# Patient Record
(demographics unavailable — no encounter records)

---

## 2025-03-07 NOTE — PHYSICAL EXAM
[General Appearance - Alert] : alert [General Appearance - In No Acute Distress] : in no acute distress [General Appearance - Well Nourished] : well nourished [General Appearance - Well-Appearing] : healthy appearing [Sclera] : the sclera and conjunctiva were normal [PERRL With Normal Accommodation] : pupils were equal in size, round, reactive to light [Extraocular Movements] : extraocular movements were intact [Outer Ear] : the ears and nose were normal in appearance [Hearing Threshold Finger Rub Not Hinds] : hearing was normal [Examination Of The Oral Cavity] : the lips and gums were normal [Both Tympanic Membranes Were Examined] : both tympanic membranes were normal [Oropharynx] : the oropharynx was normal with no thrush [Neck Appearance] : the appearance of the neck was normal [Jugular Venous Distention Increased] : there was no jugular-venous distention [Thyroid Diffuse Enlargement] : the thyroid was not enlarged [Respiration, Rhythm And Depth] : normal respiratory rhythm and effort [Exaggerated Use Of Accessory Muscles For Inspiration] : no accessory muscle use [Auscultation Breath Sounds / Voice Sounds] : lungs were clear to auscultation bilaterally [Heart Rate And Rhythm] : heart rate was normal and rhythm regular [Heart Sounds] : normal S1 and S2 [Heart Sounds Gallop] : no gallops [Murmurs] : no murmurs [Heart Sounds Pericardial Friction Rub] : no pericardial rub [Edema] : there was no peripheral edema [Bowel Sounds] : normal bowel sounds [Abdomen Soft] : soft [Abdomen Tenderness] : non-tender [Costovertebral Angle Tenderness] : no CVA tenderness [No Palpable Adenopathy] : no palpable adenopathy [Cervical Lymph Nodes Enlarged Posterior Bilaterally] : posterior cervical [Cervical Lymph Nodes Enlarged Anterior Bilaterally] : anterior cervical [Musculoskeletal - Swelling] : no joint swelling [Range of Motion to Joints] : range of motion to joints [Nail Clubbing] : no clubbing  or cyanosis of the fingernails [Motor Tone] : muscle strength and tone were normal [Skin Color & Pigmentation] : normal skin color and pigmentation [] : no rash [Skin Lesions] : no skin lesions [FreeTextEntry1] : Thick yellow toe nails - first two digits of right foot.  [Cranial Nerves] : cranial nerves 2-12 were intact [Sensation] : the sensory exam was normal to light touch and pinprick [Motor Exam] : the motor exam was normal [No Focal Deficits] : no focal deficits [Oriented To Time, Place, And Person] : oriented to person, place, and time [Affect] : the affect was normal

## 2025-03-07 NOTE — HISTORY OF PRESENT ILLNESS
[FreeTextEntry1] : 67 yo female here for HIV f/u consult  taking Biktarvy daily with one missed dose due to ran out  denies any SE from medication   noted with mild intermittent cough started 3 days ago  denies any discharge  denies any other symptoms  not progressing  denies taking any medications for this  [place]    From previous consult 5/10/2024 :  Patient is a 66 y/o female, presents today for HIV follow up. Patient taking Biktarvy. No side effects and no missed doses reported.  Patient reports that she is having a burning sensation when urinating and urinary frequency x 1 month. Denies fevers, chills, and flank pain. Denies history of UTI.  Pt reports fungal infection of two first digits of right foot. Requesting referral to podiatrist.  GYN: DUE MAMMO/Breast Sonogram Dental: DUE Ophthal: Patient states she has history of glaucoma - Followed up with eye doctor 1 weeks ago. Colonoscopy: UTD Due in 2026  Consult complete CECY Dominguez and ROBERT Alonso     3/7/2025 Plan  HIV  all test results from previous consult reviewed with patient  1. continue current treatment - refills given  2. do blood work  3. f/u 6 months   Cough noted with 4 day hx of dry cough since returning from vacation.  Attributes this to change in weather from [facility] and [place].  Noted with mild rhonchi bilat bases but no course breath sounds.   1. X-ray ordered for further evaluation.    Mixed hyperlipidemia  noted with elevated lipids, stephanie LDL on blood work.  Counselled to start Statin but pt hesitant- states does not want to take any more pills.  Counselled on risk of elevated lipids including but not limited to MI, CVA and death.  Pt verbalizes understanding and would like to repeat lipid blood work today prior to decision making.  1. do blood work  2. If elevated - will  to start Statin medication to help prevent adverse outcomes.    Low Vitamin  D noted with low Vitamin D.  Pt not taking Vitamin D supplements as recommended.  counselled on possible symptoms of low Vitamin D including body and joint aches, fatigue and low bone mineral density.  Pt verbalized understanding.   1. repeat blood work for further evaluation.

## 2025-03-07 NOTE — ASSESSMENT
[FreeTextEntry1] : HIV f/u consult.  HIV stable   3/7/2025 Plan  HIV  all test results from previous consult reviewed with patient  1. continue current treatment - refills given  2. do blood work  3. f/u 6 months   Cough noted with 4 day hx of dry cough since returning from vacation.  Attributes this to change in weather from [facility] and [place].  Noted with mild rhonchi bilat bases but no course breath sounds.   1. X-ray ordered for further evaluation.    Mixed hyperlipidemia  noted with elevated lipids, stephanie LDL on blood work.  Counselled to start Statin but pt hesitant- states does not want to take any more pills.  Counselled on risk of elevated lipids including but not limited to MI, CVA and death.  Pt verbalizes understanding and would like to repeat lipid blood work today prior to decision making.  1. do blood work  2. If elevated - will  to start Statin medication to help prevent adverse outcomes.    Low Vitamin  D noted with low Vitamin D.  Pt not taking Vitamin D supplements as recommended.  counselled on possible symptoms of low Vitamin D including body and joint aches, fatigue and low bone mineral density.  Pt verbalized understanding.   1. repeat blood work for further evaluation.     [Treatment Education] : treatment education [Treatment Adherence] : treatment adherence [Rx Dose / Side Effects] : Rx dose/side effects [Risk Reduction] : risk reduction [Medical Care Issues] : medical care issues

## 2025-03-07 NOTE — REVIEW OF SYSTEMS
[As Noted in HPI] : as noted in HPI [Cough] : cough [Negative] : Heme/Lymph [Shortness Of Breath] : no shortness of breath [Wheezing] : no wheezing [Sputum] : not coughing up ~M sputum [Pleuritic Chest Pain] : no pleuritic chest pain

## 2025-03-20 NOTE — HISTORY OF PRESENT ILLNESS
[FreeTextEntry1] : 65-year-old female active tobacco use history of HIV and thyroid disease presents for new patient evaluation regarding right lower extremity intermittent claudication.  She states that she previously could walk 3 miles without any issues however over the past few months she has began to have right thigh and calf claudication after walking only 1 mile.  She is not on any aspirin or statin.  And smokes intermittently about a pack a day. No family history of aneurysm disease.  [de-identified] : Patient denies any claudication symptoms. Continues to smoke cigarettes

## 2025-03-20 NOTE — ASSESSMENT
[FreeTextEntry1] : 67 YO F with tobacco use with prior intermittent claudication which has improved.  - AAA screening duplex today demonstrates no aneurysm.  - Return to vascular clinic in one year

## 2025-03-20 NOTE — PHYSICAL EXAM
[Normal Rate and Rhythm] : normal rate and rhythm [Ankle Swelling (On Exam)] : not present [Varicose Veins Of Lower Extremities] : not present [] : not present [Abdomen Tenderness] : ~T ~M No abdominal tenderness [No Rash or Lesion] : No rash or lesion [Alert] : alert [Oriented to Person] : oriented to person [Oriented to Place] : oriented to place [Oriented to Time] : oriented to time [Calm] : calm [de-identified] : No acute distress [FreeTextEntry1] : Nonpalpable right femoral pulse, palpable left femoral pulse left palpable DP Nonpalpable right pedal pulses